# Patient Record
Sex: FEMALE | Race: WHITE | NOT HISPANIC OR LATINO
[De-identification: names, ages, dates, MRNs, and addresses within clinical notes are randomized per-mention and may not be internally consistent; named-entity substitution may affect disease eponyms.]

---

## 2017-04-08 ENCOUNTER — RX RENEWAL (OUTPATIENT)
Age: 72
End: 2017-04-08

## 2017-12-28 ENCOUNTER — RX RENEWAL (OUTPATIENT)
Age: 72
End: 2017-12-28

## 2018-04-01 ENCOUNTER — RX RENEWAL (OUTPATIENT)
Age: 73
End: 2018-04-01

## 2018-04-05 ENCOUNTER — APPOINTMENT (OUTPATIENT)
Dept: FAMILY MEDICINE | Facility: CLINIC | Age: 73
End: 2018-04-05
Payer: COMMERCIAL

## 2018-04-05 VITALS
RESPIRATION RATE: 14 BRPM | OXYGEN SATURATION: 98 % | HEART RATE: 88 BPM | TEMPERATURE: 98.6 F | WEIGHT: 135 LBS | SYSTOLIC BLOOD PRESSURE: 158 MMHG | HEIGHT: 60 IN | DIASTOLIC BLOOD PRESSURE: 80 MMHG | BODY MASS INDEX: 26.5 KG/M2

## 2018-04-05 VITALS — SYSTOLIC BLOOD PRESSURE: 170 MMHG | DIASTOLIC BLOOD PRESSURE: 78 MMHG

## 2018-04-05 DIAGNOSIS — Z82.49 FAMILY HISTORY OF ISCHEMIC HEART DISEASE AND OTHER DISEASES OF THE CIRCULATORY SYSTEM: ICD-10-CM

## 2018-04-05 DIAGNOSIS — Z80.6 FAMILY HISTORY OF LEUKEMIA: ICD-10-CM

## 2018-04-05 PROCEDURE — 99202 OFFICE O/P NEW SF 15 MIN: CPT

## 2018-04-05 RX ORDER — SULFASALAZINE 500 MG/1
500 TABLET, DELAYED RELEASE ORAL
Qty: 120 | Refills: 0 | Status: ACTIVE | COMMUNITY
Start: 2018-03-14

## 2018-04-05 RX ORDER — CONJUGATED ESTROGENS AND MEDROXYPROGESTERONE ACETATE .3; 1.5 MG/1; MG/1
0.3-1.5 TABLET, SUGAR COATED ORAL
Qty: 84 | Refills: 0 | Status: ACTIVE | COMMUNITY
Start: 2017-11-24

## 2018-04-05 RX ORDER — ECONAZOLE NITRATE 10 MG/G
1 CREAM TOPICAL
Qty: 85 | Refills: 0 | Status: ACTIVE | COMMUNITY
Start: 2018-02-27

## 2018-05-04 LAB — HBA1C MFR BLD HPLC: 4.5

## 2018-05-07 ENCOUNTER — NON-APPOINTMENT (OUTPATIENT)
Age: 73
End: 2018-05-07

## 2018-05-07 ENCOUNTER — TRANSCRIPTION ENCOUNTER (OUTPATIENT)
Age: 73
End: 2018-05-07

## 2018-05-07 ENCOUNTER — APPOINTMENT (OUTPATIENT)
Dept: FAMILY MEDICINE | Facility: CLINIC | Age: 73
End: 2018-05-07
Payer: COMMERCIAL

## 2018-05-07 VITALS
DIASTOLIC BLOOD PRESSURE: 70 MMHG | RESPIRATION RATE: 17 BRPM | HEART RATE: 80 BPM | SYSTOLIC BLOOD PRESSURE: 130 MMHG | TEMPERATURE: 98.6 F | OXYGEN SATURATION: 97 % | HEIGHT: 61 IN

## 2018-05-07 DIAGNOSIS — I49.9 CARDIAC ARRHYTHMIA, UNSPECIFIED: ICD-10-CM

## 2018-05-07 PROCEDURE — G0009: CPT

## 2018-05-07 PROCEDURE — 90670 PCV13 VACCINE IM: CPT

## 2018-05-07 PROCEDURE — 99397 PER PM REEVAL EST PAT 65+ YR: CPT | Mod: 25

## 2018-05-07 PROCEDURE — 93000 ELECTROCARDIOGRAM COMPLETE: CPT

## 2018-05-21 ENCOUNTER — APPOINTMENT (OUTPATIENT)
Dept: FAMILY MEDICINE | Facility: CLINIC | Age: 73
End: 2018-05-21
Payer: COMMERCIAL

## 2018-05-21 VITALS — SYSTOLIC BLOOD PRESSURE: 122 MMHG | TEMPERATURE: 98 F | DIASTOLIC BLOOD PRESSURE: 60 MMHG

## 2018-05-21 DIAGNOSIS — R01.1 CARDIAC MURMUR, UNSPECIFIED: ICD-10-CM

## 2018-05-21 PROCEDURE — 99213 OFFICE O/P EST LOW 20 MIN: CPT

## 2018-05-21 NOTE — ASSESSMENT
[FreeTextEntry1] : She presents to office for followup on her blood pressure and heart rate.Pt continues to have dry cough for several weeks\par \par Referred for ECHO\par Pt to d/c Irbesartan for 1-2 weeks to see if cough resolves\par Call in 1 -2 weeks\par

## 2018-05-21 NOTE — HISTORY OF PRESENT ILLNESS
[de-identified] : She presents to office for followup on her blood pressure and heart rate.Continues to have dry cough

## 2018-05-21 NOTE — PHYSICAL EXAM
[Clear to Auscultation] : lungs were clear to auscultation bilaterally [No Carotid Bruits] : no carotid bruits [de-identified] : RRR with 2/6 systolic mumur

## 2018-08-27 ENCOUNTER — APPOINTMENT (OUTPATIENT)
Dept: FAMILY MEDICINE | Facility: CLINIC | Age: 73
End: 2018-08-27
Payer: COMMERCIAL

## 2018-08-27 VITALS — DIASTOLIC BLOOD PRESSURE: 84 MMHG | TEMPERATURE: 97.8 F | SYSTOLIC BLOOD PRESSURE: 132 MMHG

## 2018-08-27 DIAGNOSIS — M51.9 UNSPECIFIED THORACIC, THORACOLUMBAR AND LUMBOSACRAL INTERVERTEBRAL DISC DISORDER: ICD-10-CM

## 2018-08-27 PROCEDURE — 99214 OFFICE O/P EST MOD 30 MIN: CPT

## 2018-08-27 NOTE — HISTORY OF PRESENT ILLNESS
[de-identified] : She presents to office for followup on her blood pressure and cough. Patient states she stopped her Irbesartan  cough resolved however restarted Irbesartan and cough still resolved. BP at home good readings. Pt c/p persistent LBP.Pt has hx of herniated discs. Pain radiating down leg at times however pain described as 3-4/10.

## 2018-08-27 NOTE — ASSESSMENT
[FreeTextEntry1] : She presents to office for followup on her blood pressure and cough. Patient states she stopped her Irbesartan  cough resolved however restarted Irbesartan and cough still resolved. BP at home good readings. Pt c/p persistent LBP.Pt has hx of herniated discs. Pain radiating down leg at times however pain described as 3-4/10.\par Pt to continue with Irbesartan\par Referred to Chiropractor/PT

## 2018-08-27 NOTE — PHYSICAL EXAM
[No Acute Distress] : no acute distress [Clear to Auscultation] : lungs were clear to auscultation bilaterally [Regular Rhythm] : with a regular rhythm [No CVA Tenderness] : no CVA  tenderness [No Spinal Tenderness] : no spinal tenderness [No Rash] : no rash [Normal Gait] : normal gait [Deep Tendon Reflexes (DTR)] : deep tendon reflexes were 2+ and symmetric [Alert and Oriented x3] : oriented to person, place, and time

## 2018-09-24 ENCOUNTER — RX RENEWAL (OUTPATIENT)
Age: 73
End: 2018-09-24

## 2018-11-11 ENCOUNTER — EMERGENCY (EMERGENCY)
Facility: HOSPITAL | Age: 73
LOS: 1 days | Discharge: HOME | End: 2018-11-11
Attending: EMERGENCY MEDICINE
Payer: COMMERCIAL

## 2018-11-11 ENCOUNTER — TRANSCRIPTION ENCOUNTER (OUTPATIENT)
Age: 73
End: 2018-11-11

## 2018-11-11 VITALS
SYSTOLIC BLOOD PRESSURE: 174 MMHG | HEART RATE: 96 BPM | DIASTOLIC BLOOD PRESSURE: 92 MMHG | HEIGHT: 62 IN | RESPIRATION RATE: 16 BRPM | TEMPERATURE: 98 F | WEIGHT: 138.01 LBS | OXYGEN SATURATION: 98 %

## 2018-11-11 VITALS — DIASTOLIC BLOOD PRESSURE: 79 MMHG | HEART RATE: 74 BPM | RESPIRATION RATE: 16 BRPM | SYSTOLIC BLOOD PRESSURE: 153 MMHG

## 2018-11-11 PROCEDURE — 99283 EMERGENCY DEPT VISIT LOW MDM: CPT

## 2018-11-11 PROCEDURE — 73120 X-RAY EXAM OF HAND: CPT | Mod: 26,RT

## 2018-11-11 RX ORDER — METRONIDAZOLE 500 MG
1 TABLET ORAL
Qty: 21 | Refills: 0
Start: 2018-11-11 | End: 2018-11-17

## 2018-11-11 RX ORDER — ACETAMINOPHEN 500 MG
650 TABLET ORAL ONCE
Refills: 0 | Status: COMPLETED | OUTPATIENT
Start: 2018-11-11 | End: 2018-11-11

## 2018-11-11 RX ORDER — METRONIDAZOLE 500 MG
500 TABLET ORAL ONCE
Refills: 0 | Status: COMPLETED | OUTPATIENT
Start: 2018-11-11 | End: 2018-11-11

## 2018-11-11 RX ADMIN — Medication 500 MILLIGRAM(S): at 16:34

## 2018-11-11 RX ADMIN — Medication 650 MILLIGRAM(S): at 16:14

## 2018-11-11 RX ADMIN — Medication 100 MILLIGRAM(S): at 16:34

## 2018-11-11 NOTE — ED PROVIDER NOTE - PHYSICAL EXAMINATION
NAD, VSS, Afebrile, No spinal tender, Lungs clear, + Right hand dorsum; superficial 2.5cm V-shaped laceration, no active bleeding or visualized FB. + Superficial abrasions with ecchymosis on ulna aspect of 2nd MCP. N/V- intact with full ROMs.

## 2018-11-11 NOTE — ED ADULT NURSE NOTE - NSIMPLEMENTINTERV_GEN_ALL_ED
Implemented All Universal Safety Interventions:  [place] to call system. Call bell, personal items and telephone within reach. Instruct patient to call for assistance. Room bathroom lighting operational. Non-slip footwear when patient is off stretcher. Physically safe environment: no spills, clutter or unnecessary equipment. Stretcher in lowest position, wheels locked, appropriate side rails in place.

## 2018-11-11 NOTE — ED ADULT NURSE NOTE - OBJECTIVE STATEMENT
72 y/o female presenting to ED for dog bite to right hand. Pt states "I was picking up my daughter's dog and she bit me. The dog is up to dates on the shots, I got my tetanus shot within the last 10 years." Upon exam pt AOx3 breathing even unlabored spontaneously, wound noted to right hand, no active bleeding noted, +ROM in all fingers, no loss in sensation.

## 2018-11-11 NOTE — ED PROVIDER NOTE - PROGRESS NOTE DETAILS
Attending MD Antoine: Patient seen by Dr. Ozuna, laceration repaired, Rx Doxy/Flagyl for home. Follow up instructions given, importance of follow up emphasized, return to ED parameters reviewed.

## 2018-11-11 NOTE — ED PROVIDER NOTE - CARE PLAN
Principal Discharge DX:	Hand laceration  Secondary Diagnosis:	Dog bite of hand Principal Discharge DX:	Laceration of hand, right, complicated, initial encounter  Secondary Diagnosis:	Dog bite of hand

## 2018-11-11 NOTE — ED PROVIDER NOTE - ATTENDING CONTRIBUTION TO CARE
pt is a 74 y/o female with animal bite by family members dog, utd on shoots, with superficial avulsion laceration, plain films, abx, wound care.

## 2018-11-11 NOTE — ED PROVIDER NOTE - SHIFT CHANGE DETAILS
Attending MD Antoine: 73F s/p dog bite by her daughter's dog now with avulsion to the R dorsal hand (R dominant), tetanus UTD, receiving dose of doxy and flagyl (PCN allergic), pending Xray.  Pending hand consult, XR and dc with doxy/flagyl.

## 2018-11-11 NOTE — ED PROVIDER NOTE - OBJECTIVE STATEMENT
72yo female pt with PMHx of HTN, ambulatory c/o right dominant hand dog bite (her daughter's dog with UTDed vaccinations) today. Denies other injuries. Pt's evaluated in UC and sent to ED for further evaluation after TD injection. Denies sensory changes or weakness to extremities. Denies CP/SOB/ABD pain or N/V. Denies fever, chills or recent sickness.

## 2018-12-17 ENCOUNTER — APPOINTMENT (OUTPATIENT)
Dept: FAMILY MEDICINE | Facility: CLINIC | Age: 73
End: 2018-12-17
Payer: COMMERCIAL

## 2018-12-17 VITALS — TEMPERATURE: 97.7 F | DIASTOLIC BLOOD PRESSURE: 80 MMHG | SYSTOLIC BLOOD PRESSURE: 138 MMHG

## 2018-12-17 DIAGNOSIS — M19.049 PRIMARY OSTEOARTHRITIS, UNSPECIFIED HAND: ICD-10-CM

## 2018-12-17 PROCEDURE — 99213 OFFICE O/P EST LOW 20 MIN: CPT

## 2018-12-17 NOTE — HISTORY OF PRESENT ILLNESS
[de-identified] : Pt presents to office for f/up for BP check.Pt now has cough.  Took herself off Sulfasalazine for her arthritis. Had bleeding gums as side effects.

## 2019-01-17 ENCOUNTER — APPOINTMENT (OUTPATIENT)
Dept: FAMILY MEDICINE | Facility: CLINIC | Age: 74
End: 2019-01-17
Payer: COMMERCIAL

## 2019-01-17 VITALS — DIASTOLIC BLOOD PRESSURE: 82 MMHG | TEMPERATURE: 98.1 F | SYSTOLIC BLOOD PRESSURE: 138 MMHG

## 2019-01-17 PROCEDURE — 99213 OFFICE O/P EST LOW 20 MIN: CPT

## 2019-01-17 RX ORDER — IRBESARTAN 150 MG/1
150 TABLET ORAL
Qty: 60 | Refills: 2 | Status: COMPLETED | COMMUNITY
Start: 2018-04-05 | End: 2019-01-17

## 2019-01-17 NOTE — ASSESSMENT
[FreeTextEntry1] : Patient presents to office for followup on her blood pressure. Patient has been on amlodipine 3-4 weeks without problems. Cough did resolve of either start taking. Patient has been monitoring blood pressures at home with readings in the 120s to 130s. Offers no complaints today. Patient recently had 20 stitches in her righthand after sustaining a dog bite\par \par Pt to continue with Amlodipine\par RTO in 3 months for PE/Labs

## 2019-04-18 ENCOUNTER — RX CHANGE (OUTPATIENT)
Age: 74
End: 2019-04-18

## 2019-04-26 ENCOUNTER — TRANSCRIPTION ENCOUNTER (OUTPATIENT)
Age: 74
End: 2019-04-26

## 2019-05-14 ENCOUNTER — APPOINTMENT (OUTPATIENT)
Dept: FAMILY MEDICINE | Facility: CLINIC | Age: 74
End: 2019-05-14
Payer: COMMERCIAL

## 2019-05-14 ENCOUNTER — NON-APPOINTMENT (OUTPATIENT)
Age: 74
End: 2019-05-14

## 2019-05-14 VITALS
HEIGHT: 61 IN | TEMPERATURE: 98.1 F | DIASTOLIC BLOOD PRESSURE: 70 MMHG | SYSTOLIC BLOOD PRESSURE: 144 MMHG | BODY MASS INDEX: 26.43 KG/M2 | OXYGEN SATURATION: 98 % | RESPIRATION RATE: 16 BRPM | WEIGHT: 140 LBS | HEART RATE: 90 BPM

## 2019-05-14 PROCEDURE — 93000 ELECTROCARDIOGRAM COMPLETE: CPT

## 2019-05-14 PROCEDURE — 99397 PER PM REEVAL EST PAT 65+ YR: CPT | Mod: 25

## 2019-05-14 RX ORDER — AMLODIPINE BESYLATE 5 MG/1
5 TABLET ORAL
Qty: 30 | Refills: 5 | Status: COMPLETED | COMMUNITY
Start: 2018-12-17 | End: 2019-05-14

## 2019-05-14 NOTE — HISTORY OF PRESENT ILLNESS
[de-identified] : Pt is a 73 y/o        presents to office for routine PE. Offers no complaints.Pt stopped Amlodipine as she was getting back pain. Monitoring /70. On iRbesartan 300 mg 1/2 dose. Pt does exercise 3-4 times weekly.  Pt takes Vit D and Calcium .

## 2019-05-14 NOTE — ASSESSMENT
[FreeTextEntry1] : Pt is a 75 y/o        presents to office for routine PE. Offers no complaints.Pt stopped Amlodipine as she was getting back pain. Monitoring /70. On iRbesartan 300 mg 1/2 dose. Pt does exercise 3-4 times weekly.  Pt takes Vit D and Calcium .  Recently had Shingrix\par \par EKG s. tach\par Labs normal\par -Pt to monitor BP at home several days week.If BP>140/8-0 consecutively will take 300mg daily\par RTO in 6 weeks for BP check\par

## 2019-05-14 NOTE — HEALTH RISK ASSESSMENT
[Patient reported colonoscopy was normal] : Patient reported colonoscopy was normal [Patient reported mammogram was normal] : Patient reported mammogram was normal [MammogramDate] : 2018 [ColonoscopyDate] : 2018

## 2019-05-14 NOTE — PHYSICAL EXAM
[No Acute Distress] : no acute distress [Well Developed] : well developed [Well Nourished] : well nourished [Well-Appearing] : well-appearing [Normal Sclera/Conjunctiva] : normal sclera/conjunctiva [PERRL] : pupils equal round and reactive to light [Normal Outer Ear/Nose] : the outer ears and nose were normal in appearance [EOMI] : extraocular movements intact [Normal Oropharynx] : the oropharynx was normal [No JVD] : no jugular venous distention [Supple] : supple [No Lymphadenopathy] : no lymphadenopathy [No Respiratory Distress] : no respiratory distress  [Clear to Auscultation] : lungs were clear to auscultation bilaterally [Thyroid Normal, No Nodules] : the thyroid was normal and there were no nodules present [Normal Rate] : normal rate  [No Accessory Muscle Use] : no accessory muscle use [Regular Rhythm] : with a regular rhythm [Normal S1, S2] : normal S1 and S2 [No Varicosities] : no varicosities [No Carotid Bruits] : no carotid bruits [No Abdominal Bruit] : a ~M bruit was not heard ~T in the abdomen [No Extremity Clubbing/Cyanosis] : no extremity clubbing/cyanosis [Pedal Pulses Present] : the pedal pulses are present [No Edema] : there was no peripheral edema [No Palpable Aorta] : no palpable aorta [Soft] : abdomen soft [Non-distended] : non-distended [No Masses] : no abdominal mass palpated [Non Tender] : non-tender [Normal Posterior Cervical Nodes] : no posterior cervical lymphadenopathy [Normal Bowel Sounds] : normal bowel sounds [No HSM] : no HSM [No CVA Tenderness] : no CVA  tenderness [Normal Anterior Cervical Nodes] : no anterior cervical lymphadenopathy [No Spinal Tenderness] : no spinal tenderness [No Joint Swelling] : no joint swelling [Grossly Normal Strength/Tone] : grossly normal strength/tone [No Rash] : no rash [Coordination Grossly Intact] : coordination grossly intact [Normal Gait] : normal gait [No Focal Deficits] : no focal deficits [Deep Tendon Reflexes (DTR)] : deep tendon reflexes were 2+ and symmetric [Normal Insight/Judgement] : insight and judgment were intact [Normal Affect] : the affect was normal [de-identified] : Gr 1-2 systolic m

## 2019-06-28 ENCOUNTER — APPOINTMENT (OUTPATIENT)
Dept: FAMILY MEDICINE | Facility: CLINIC | Age: 74
End: 2019-06-28
Payer: COMMERCIAL

## 2019-06-28 VITALS — SYSTOLIC BLOOD PRESSURE: 130 MMHG | DIASTOLIC BLOOD PRESSURE: 72 MMHG

## 2019-06-28 PROCEDURE — 99213 OFFICE O/P EST LOW 20 MIN: CPT

## 2019-06-28 NOTE — HISTORY OF PRESENT ILLNESS
[de-identified] : 73y/o F with PMHx of HTN (Irbesartan) and Arrhythmia presents to the office for f/u on BP check. Pt had c/o HA, since resolved. Pt admits to being on a recent 3 day cleanse, lost 3lbs. Pt monitors BP at home, readings normal. Pt states Irbesartan has been on back over, has been taking 1/2 of Irbesartan. BP in office is 130/72, on repeat 132/70. Denies CP, SOB, N/V/D or dizziness. \par \par

## 2019-06-28 NOTE — ADDENDUM
[FreeTextEntry1] : I, Terrie Cardoza, acted solely as a scribe for Dr. Benoit on this date 06/28/2019.\par \par All medical record entries made by the Scribe were at my, Dr. Benoit, direction and personally dictated by me on 06/28/2019. I have reviewed the chart and agree that the record accurately reflects my personal performance of the history, physical exam, assessment and plan.  I have also personally directed, reviewed and agreed with the chart.

## 2019-06-28 NOTE — REVIEW OF SYSTEMS
[Negative] : Neurological [Chest Pain] : no chest pain [Recent Change In Weight] : ~T recent weight change [Shortness Of Breath] : no shortness of breath [Nausea] : no nausea [Diarrhea] : diarrhea [Vomiting] : no vomiting [Dizziness] : no dizziness

## 2019-11-19 ENCOUNTER — APPOINTMENT (OUTPATIENT)
Dept: FAMILY MEDICINE | Facility: CLINIC | Age: 74
End: 2019-11-19
Payer: COMMERCIAL

## 2019-11-19 VITALS — SYSTOLIC BLOOD PRESSURE: 126 MMHG | DIASTOLIC BLOOD PRESSURE: 70 MMHG

## 2019-11-19 PROCEDURE — 99213 OFFICE O/P EST LOW 20 MIN: CPT

## 2019-11-19 NOTE — HISTORY OF PRESENT ILLNESS
[de-identified] : 73y/o F with PMHx of HTN (Irbesartan) and Arrhythmia presents to the office for f/u of blood pressure and anti-hypertensive medications. Pt is currently taking Irbesartan 150mg (half of 300mg) and is compliant with all medications. Continues to monitor BP regularly at home with SBP of ~120. BP in office is 126/70, on repeat 138/70. Denies HA, Dizziness, CP, SOB, N/V, lightheadedness or near-syncopal events. Denies side effects to new or current medications.

## 2019-11-19 NOTE — REVIEW OF SYSTEMS
[Negative] : Neurological [Chest Pain] : no chest pain [Shortness Of Breath] : no shortness of breath [Headache] : no headache [Dizziness] : no dizziness

## 2019-11-19 NOTE — PLAN
[FreeTextEntry1] : Hypertension\par - Continue on Irbesartan 150mg (half of 300mg)\par - D/c monitoring BP, unless feeling HA or dizziness\par - F/u in 4 months\par \par - Pt refuses flu vaccine\par - Shingrix UTD

## 2019-11-19 NOTE — ADDENDUM
[FreeTextEntry1] : I, Bhavya Hernandez, acted solely as a scribe for Dr. Benoit on this date 11/19/2019.\par \par All medical record entries made by the Scribe were at my, Dr. Benoit, direction and personally dictated by me on 11/19/2019. I have reviewed the chart and agree that the record accurately reflects my personal performance of the history, physical exam, assessment and plan.  I have also personally directed, reviewed and agreed with the chart.

## 2020-07-20 ENCOUNTER — NON-APPOINTMENT (OUTPATIENT)
Age: 75
End: 2020-07-20

## 2020-07-20 ENCOUNTER — APPOINTMENT (OUTPATIENT)
Dept: FAMILY MEDICINE | Facility: CLINIC | Age: 75
End: 2020-07-20
Payer: COMMERCIAL

## 2020-07-20 VITALS
DIASTOLIC BLOOD PRESSURE: 66 MMHG | HEIGHT: 61 IN | TEMPERATURE: 97.8 F | RESPIRATION RATE: 14 BRPM | WEIGHT: 135 LBS | OXYGEN SATURATION: 96 % | BODY MASS INDEX: 25.49 KG/M2 | HEART RATE: 81 BPM | SYSTOLIC BLOOD PRESSURE: 136 MMHG

## 2020-07-20 PROCEDURE — 99397 PER PM REEVAL EST PAT 65+ YR: CPT | Mod: 25

## 2020-07-20 PROCEDURE — 93000 ELECTROCARDIOGRAM COMPLETE: CPT

## 2020-07-20 NOTE — HISTORY OF PRESENT ILLNESS
[de-identified] : 76 y/o F PMHx HTN (Irbesartan) presents to office for routine PE. Offers no complaints. FH of HLD.Pt watches diet and exercises daily.Monitors BP at home with all good readings under 120/80

## 2020-07-20 NOTE — HEALTH RISK ASSESSMENT
[Patient reported mammogram was normal] : Patient reported mammogram was normal [Patient reported bone density results were normal] : Patient reported bone density results were normal [Patient reported PAP Smear was normal] : Patient reported PAP Smear was normal [MammogramDate] : 7/20 [PapSmearDate] : 2020 [BoneDensityDate] : 2020

## 2020-07-20 NOTE — PHYSICAL EXAM
[No Acute Distress] : no acute distress [Well Nourished] : well nourished [Well-Appearing] : well-appearing [Well Developed] : well developed [Normal Sclera/Conjunctiva] : normal sclera/conjunctiva [PERRL] : pupils equal round and reactive to light [EOMI] : extraocular movements intact [Normal Oropharynx] : the oropharynx was normal [Normal Outer Ear/Nose] : the outer ears and nose were normal in appearance [No JVD] : no jugular venous distention [No Lymphadenopathy] : no lymphadenopathy [Supple] : supple [Thyroid Normal, No Nodules] : the thyroid was normal and there were no nodules present [No Respiratory Distress] : no respiratory distress  [Clear to Auscultation] : lungs were clear to auscultation bilaterally [No Accessory Muscle Use] : no accessory muscle use [Normal Rate] : normal rate  [Regular Rhythm] : with a regular rhythm [Normal S1, S2] : normal S1 and S2 [No Carotid Bruits] : no carotid bruits [No Abdominal Bruit] : a ~M bruit was not heard ~T in the abdomen [No Varicosities] : no varicosities [No Palpable Aorta] : no palpable aorta [Pedal Pulses Present] : the pedal pulses are present [No Edema] : there was no peripheral edema [No Extremity Clubbing/Cyanosis] : no extremity clubbing/cyanosis [Soft] : abdomen soft [No Masses] : no abdominal mass palpated [Non-distended] : non-distended [Non Tender] : non-tender [Normal Posterior Cervical Nodes] : no posterior cervical lymphadenopathy [Normal Bowel Sounds] : normal bowel sounds [No HSM] : no HSM [Normal Anterior Cervical Nodes] : no anterior cervical lymphadenopathy [No CVA Tenderness] : no CVA  tenderness [Grossly Normal Strength/Tone] : grossly normal strength/tone [No Joint Swelling] : no joint swelling [No Spinal Tenderness] : no spinal tenderness [Coordination Grossly Intact] : coordination grossly intact [No Rash] : no rash [Deep Tendon Reflexes (DTR)] : deep tendon reflexes were 2+ and symmetric [Normal Gait] : normal gait [No Focal Deficits] : no focal deficits [Normal Affect] : the affect was normal [Normal Insight/Judgement] : insight and judgment were intact [de-identified] : Gr 1/6 systolic M

## 2021-11-02 ENCOUNTER — TRANSCRIPTION ENCOUNTER (OUTPATIENT)
Age: 76
End: 2021-11-02

## 2022-05-01 ENCOUNTER — RX RENEWAL (OUTPATIENT)
Age: 77
End: 2022-05-01

## 2022-05-19 ENCOUNTER — RX RENEWAL (OUTPATIENT)
Age: 77
End: 2022-05-19

## 2022-06-29 ENCOUNTER — RX RENEWAL (OUTPATIENT)
Age: 77
End: 2022-06-29

## 2022-07-07 ENCOUNTER — NON-APPOINTMENT (OUTPATIENT)
Age: 77
End: 2022-07-07

## 2022-07-10 ENCOUNTER — TRANSCRIPTION ENCOUNTER (OUTPATIENT)
Age: 77
End: 2022-07-10

## 2022-08-09 NOTE — REVIEW OF SYSTEMS
8/9/2022  2:15 PM        Pt called today and left voice mail at 12:38 pm.Pt stated she have been having a high heart rate she wake up and it is between 85-90 and goes up from there. Pt stated this has been happening for quite a while pt stated she went to the doctor and her tsh was 0. 4. Pt was thinking she may be being over treated for hyperthyroidism pt is taking 100 micrograms and was wondering if it would be wise to try 75 micrograms or the next scale down the patient stated it is uncomfortable. Pt is in [place] and stated she can go to any pharmacy and they will fill it. #[phone]      Thanks,  Nicol Fillers [Negative] : Heme/Lymph

## 2022-09-16 ENCOUNTER — APPOINTMENT (OUTPATIENT)
Dept: FAMILY MEDICINE | Facility: CLINIC | Age: 77
End: 2022-09-16

## 2022-09-16 ENCOUNTER — NON-APPOINTMENT (OUTPATIENT)
Age: 77
End: 2022-09-16

## 2022-09-16 VITALS
RESPIRATION RATE: 14 BRPM | SYSTOLIC BLOOD PRESSURE: 140 MMHG | WEIGHT: 123 LBS | HEART RATE: 113 BPM | BODY MASS INDEX: 24.8 KG/M2 | HEIGHT: 59.06 IN | OXYGEN SATURATION: 98 % | TEMPERATURE: 97.8 F | DIASTOLIC BLOOD PRESSURE: 72 MMHG

## 2022-09-16 DIAGNOSIS — Z71.85 ENCOUNTER FOR IMMUNIZATION SAFETY COUNSELING: ICD-10-CM

## 2022-09-16 PROCEDURE — 99397 PER PM REEVAL EST PAT 65+ YR: CPT | Mod: 25

## 2022-09-16 PROCEDURE — 93000 ELECTROCARDIOGRAM COMPLETE: CPT

## 2022-09-16 NOTE — HISTORY OF PRESENT ILLNESS
[de-identified] : 76 y/o F PMHx HTN (Irbesartan) , HLD presents to office for routine PE Pt had Covid 7/2022. UTD with 3 Covid shots. On Optiva (Lost 20 pounds)

## 2022-09-16 NOTE — HEALTH RISK ASSESSMENT
[Patient reported mammogram was normal] : Patient reported mammogram was normal [Patient reported PAP Smear was normal] : Patient reported PAP Smear was normal [Patient reported bone density results were normal] : Patient reported bone density results were normal [MammogramDate] : 11/2021 [PapSmearDate] : 2022 [BoneDensityDate] : 2022

## 2022-12-06 NOTE — HISTORY OF PRESENT ILLNESS
[de-identified] : Patient presents to office for followup on her blood pressure. Patient has been on amlodipine 3-4 weeks without problems. Cough did resolve of either start taking. Patient has been monitoring blood pressures at home with readings in the 120s to 130s. Offers no complaints today. Patient recently had 20 stitches in her righthand after sustaining a dog bite Render In Strict Bullet Format?: No Initiate Treatment: tretinoin 0.05 % topical cream Detail Level: Zone

## 2023-01-25 ENCOUNTER — NON-APPOINTMENT (OUTPATIENT)
Age: 78
End: 2023-01-25

## 2023-02-24 ENCOUNTER — RX RENEWAL (OUTPATIENT)
Age: 78
End: 2023-02-24

## 2023-04-10 ENCOUNTER — APPOINTMENT (OUTPATIENT)
Dept: FAMILY MEDICINE | Facility: CLINIC | Age: 78
End: 2023-04-10
Payer: COMMERCIAL

## 2023-04-10 VITALS
OXYGEN SATURATION: 98 % | DIASTOLIC BLOOD PRESSURE: 86 MMHG | SYSTOLIC BLOOD PRESSURE: 136 MMHG | RESPIRATION RATE: 14 BRPM | HEART RATE: 94 BPM | TEMPERATURE: 97.8 F

## 2023-04-10 DIAGNOSIS — R05.9 COUGH, UNSPECIFIED: ICD-10-CM

## 2023-04-10 DIAGNOSIS — J32.9 CHRONIC SINUSITIS, UNSPECIFIED: ICD-10-CM

## 2023-04-10 PROCEDURE — 99213 OFFICE O/P EST LOW 20 MIN: CPT

## 2023-04-10 RX ORDER — BENZONATATE 200 MG/1
200 CAPSULE ORAL
Qty: 20 | Refills: 1 | Status: ACTIVE | COMMUNITY
Start: 2023-04-10 | End: 1900-01-01

## 2023-04-10 NOTE — PHYSICAL EXAM
[Normal] : no posterior cervical lymphadenopathy and no anterior cervical lymphadenopathy [de-identified] : mucus in throat

## 2023-04-10 NOTE — HISTORY OF PRESENT ILLNESS
[FreeTextEntry8] : 77 y/o F PMHx HTN (Irbesartan) , HLD presents to office  for sinus pressure for 7 days. No Fever. Yellow nasal discharge.  FAcial pains.  Dry cough . Taking OTC cough drops

## 2023-04-10 NOTE — REVIEW OF SYSTEMS
[Nasal Discharge] : no nasal discharge [Postnasal Drip] : postnasal drip [Cough] : cough [Negative] : Cardiovascular

## 2023-04-12 RX ORDER — AZITHROMYCIN 250 MG/1
250 TABLET, FILM COATED ORAL
Qty: 1 | Refills: 0 | Status: ACTIVE | COMMUNITY
Start: 2023-04-12 | End: 1900-01-01

## 2023-06-08 ENCOUNTER — RX RENEWAL (OUTPATIENT)
Age: 78
End: 2023-06-08

## 2023-10-24 ENCOUNTER — TRANSCRIPTION ENCOUNTER (OUTPATIENT)
Age: 78
End: 2023-10-24

## 2023-10-24 DIAGNOSIS — H00.019 HORDEOLUM EXTERNUM UNSPECIFIED EYE, UNSPECIFIED EYELID: ICD-10-CM

## 2023-11-02 ENCOUNTER — RX RENEWAL (OUTPATIENT)
Age: 78
End: 2023-11-02

## 2023-11-02 RX ORDER — BACITRACIN 500 [USP'U]/G
500 OINTMENT OPHTHALMIC
Qty: 3.5 | Refills: 0 | Status: ACTIVE | COMMUNITY
Start: 2023-10-24 | End: 1900-01-01

## 2023-12-07 ENCOUNTER — RX RENEWAL (OUTPATIENT)
Age: 78
End: 2023-12-07

## 2023-12-07 RX ORDER — ATORVASTATIN CALCIUM 20 MG/1
20 TABLET, FILM COATED ORAL
Qty: 30 | Refills: 5 | Status: ACTIVE | COMMUNITY
Start: 2020-07-20 | End: 1900-01-01

## 2024-02-21 NOTE — PLAN
[FreeTextEntry1] : HTN\par - f/u for BP check in October. \par Will consider changing to 2 tabs 75mg as pt is pill cutting at this time due to back order of Irbesartan 150mg Fall

## 2024-03-13 ENCOUNTER — NON-APPOINTMENT (OUTPATIENT)
Age: 79
End: 2024-03-13

## 2024-03-13 DIAGNOSIS — Z00.00 ENCOUNTER FOR GENERAL ADULT MEDICAL EXAMINATION W/OUT ABNORMAL FINDINGS: ICD-10-CM

## 2024-05-17 ENCOUNTER — NON-APPOINTMENT (OUTPATIENT)
Age: 79
End: 2024-05-17

## 2024-05-17 ENCOUNTER — APPOINTMENT (OUTPATIENT)
Dept: FAMILY MEDICINE | Facility: CLINIC | Age: 79
End: 2024-05-17
Payer: COMMERCIAL

## 2024-05-17 VITALS
WEIGHT: 123 LBS | DIASTOLIC BLOOD PRESSURE: 80 MMHG | HEART RATE: 115 BPM | RESPIRATION RATE: 16 BRPM | SYSTOLIC BLOOD PRESSURE: 150 MMHG | HEIGHT: 59 IN | OXYGEN SATURATION: 98 % | TEMPERATURE: 97.3 F | BODY MASS INDEX: 24.8 KG/M2

## 2024-05-17 DIAGNOSIS — G25.81 RESTLESS LEGS SYNDROME: ICD-10-CM

## 2024-05-17 DIAGNOSIS — E78.5 HYPERLIPIDEMIA, UNSPECIFIED: ICD-10-CM

## 2024-05-17 DIAGNOSIS — Z13.6 ENCOUNTER FOR SCREENING FOR CARDIOVASCULAR DISORDERS: ICD-10-CM

## 2024-05-17 DIAGNOSIS — I10 ESSENTIAL (PRIMARY) HYPERTENSION: ICD-10-CM

## 2024-05-17 DIAGNOSIS — Z00.00 ENCOUNTER FOR GENERAL ADULT MEDICAL EXAMINATION W/OUT ABNORMAL FINDINGS: ICD-10-CM

## 2024-05-17 PROCEDURE — G0439: CPT

## 2024-05-17 PROCEDURE — 93000 ELECTROCARDIOGRAM COMPLETE: CPT

## 2024-05-17 RX ORDER — IRBESARTAN 300 MG/1
300 TABLET ORAL
Qty: 30 | Refills: 2 | Status: COMPLETED | COMMUNITY
Start: 2023-02-24 | End: 2024-05-17

## 2024-05-17 NOTE — HISTORY OF PRESENT ILLNESS
[de-identified] : 78 y/o F PMHx HTN (Irbesartan (, HLD (Atorvastatin) presents for Medicare Wellness exam. Offers no complaints

## 2024-05-17 NOTE — HEALTH RISK ASSESSMENT
[Excellent] : ~his/her~ current health as excellent [Very Good] : ~his/her~  mood as very good [No] : No [Never (0 pts)] : Never (0 points) [No falls in past year] : Patient reported no falls in the past year [0] : 2) Feeling down, depressed, or hopeless: Not at all (0) [PHQ-2 Negative - No further assessment needed] : PHQ-2 Negative - No further assessment needed [Never] : Never [Patient reported mammogram was normal] : Patient reported mammogram was normal [Patient reported PAP Smear was normal] : Patient reported PAP Smear was normal [Patient reported bone density results were normal] : Patient reported bone density results were normal [Patient reported colonoscopy was normal] : Patient reported colonoscopy was normal [None] : None [With Significant Other] : lives with significant other [] :  [Fully functional (bathing, dressing, toileting, transferring, walking, feeding)] : Fully functional (bathing, dressing, toileting, transferring, walking, feeding) [Fully functional (using the telephone, shopping, preparing meals, housekeeping, doing laundry, using] : Fully functional and needs no help or supervision to perform IADLs (using the telephone, shopping, preparing meals, housekeeping, doing laundry, using transportation, managing medications and managing finances) [Smoke Detector] : smoke detector [Carbon Monoxide Detector] : carbon monoxide detector [With Patient/Caregiver] : , with patient/caregiver [Designated Healthcare Proxy] : Designated healthcare proxy [Name: ___] : Health Care Proxy's Name: [unfilled]  [Relationship: ___] : Relationship: [unfilled] [de-identified] : daily [[id number]] : 0 [Reports changes in hearing] : Reports no changes in hearing [Reports changes in vision] : Reports no changes in vision [Reports changes in dental health] : Reports no changes in dental health [Guns at Home] : no guns at home [MammogramDate] : 11/2023 [PapSmearDate] : 8/2023 [BoneDensityDate] : 2023 [ColonoscopyDate] : 2020

## 2024-09-16 ENCOUNTER — TRANSCRIPTION ENCOUNTER (OUTPATIENT)
Age: 79
End: 2024-09-16

## 2024-09-17 ENCOUNTER — NON-APPOINTMENT (OUTPATIENT)
Age: 79
End: 2024-09-17

## 2025-05-13 ENCOUNTER — APPOINTMENT (OUTPATIENT)
Dept: FAMILY MEDICINE | Facility: CLINIC | Age: 80
End: 2025-05-13
Payer: COMMERCIAL

## 2025-05-13 VITALS
TEMPERATURE: 97.9 F | WEIGHT: 123 LBS | SYSTOLIC BLOOD PRESSURE: 150 MMHG | HEIGHT: 59 IN | HEART RATE: 89 BPM | RESPIRATION RATE: 16 BRPM | BODY MASS INDEX: 24.8 KG/M2 | DIASTOLIC BLOOD PRESSURE: 84 MMHG | OXYGEN SATURATION: 97 %

## 2025-05-13 DIAGNOSIS — G47.62 SLEEP RELATED LEG CRAMPS: ICD-10-CM

## 2025-05-13 DIAGNOSIS — I10 ESSENTIAL (PRIMARY) HYPERTENSION: ICD-10-CM

## 2025-05-13 DIAGNOSIS — G25.81 RESTLESS LEGS SYNDROME: ICD-10-CM

## 2025-05-13 DIAGNOSIS — Z00.00 ENCOUNTER FOR GENERAL ADULT MEDICAL EXAMINATION W/OUT ABNORMAL FINDINGS: ICD-10-CM

## 2025-05-13 DIAGNOSIS — E78.5 HYPERLIPIDEMIA, UNSPECIFIED: ICD-10-CM

## 2025-05-13 PROCEDURE — G2211 COMPLEX E/M VISIT ADD ON: CPT | Mod: NC

## 2025-05-13 PROCEDURE — 99214 OFFICE O/P EST MOD 30 MIN: CPT

## 2025-05-14 PROBLEM — G47.62 NOCTURNAL LEG CRAMPS: Status: ACTIVE | Noted: 2025-05-14

## 2025-07-09 ENCOUNTER — NON-APPOINTMENT (OUTPATIENT)
Age: 80
End: 2025-07-09

## 2025-07-14 ENCOUNTER — APPOINTMENT (OUTPATIENT)
Dept: FAMILY MEDICINE | Facility: CLINIC | Age: 80
End: 2025-07-14
Payer: COMMERCIAL

## 2025-07-14 VITALS
DIASTOLIC BLOOD PRESSURE: 82 MMHG | OXYGEN SATURATION: 97 % | SYSTOLIC BLOOD PRESSURE: 140 MMHG | BODY MASS INDEX: 25 KG/M2 | TEMPERATURE: 98.6 F | WEIGHT: 124 LBS | HEIGHT: 59 IN | HEART RATE: 65 BPM | RESPIRATION RATE: 16 BRPM

## 2025-07-14 PROBLEM — N28.9 RENAL INSUFFICIENCY: Status: ACTIVE | Noted: 2025-07-14

## 2025-07-14 PROBLEM — K40.90 INGUINAL HERNIA, RIGHT: Status: ACTIVE | Noted: 2025-07-14

## 2025-07-14 PROCEDURE — 99215 OFFICE O/P EST HI 40 MIN: CPT

## 2025-07-14 PROCEDURE — G2211 COMPLEX E/M VISIT ADD ON: CPT | Mod: NC

## 2025-07-14 RX ORDER — ROSUVASTATIN CALCIUM 5 MG/1
5 TABLET, FILM COATED ORAL
Qty: 30 | Refills: 6 | Status: ACTIVE | COMMUNITY
Start: 2025-07-14 | End: 1900-01-01

## 2025-07-14 RX ORDER — AMLODIPINE BESYLATE 5 MG/1
5 TABLET ORAL
Qty: 30 | Refills: 3 | Status: ACTIVE | COMMUNITY
Start: 2025-07-14 | End: 1900-01-01

## 2025-09-12 DIAGNOSIS — Z00.00 ENCOUNTER FOR GENERAL ADULT MEDICAL EXAMINATION W/OUT ABNORMAL FINDINGS: ICD-10-CM

## 2025-09-16 ENCOUNTER — RX RENEWAL (OUTPATIENT)
Age: 80
End: 2025-09-16